# Patient Record
Sex: MALE | Race: OTHER | HISPANIC OR LATINO | URBAN - METROPOLITAN AREA
[De-identification: names, ages, dates, MRNs, and addresses within clinical notes are randomized per-mention and may not be internally consistent; named-entity substitution may affect disease eponyms.]

---

## 2022-07-25 ENCOUNTER — EMERGENCY (EMERGENCY)
Facility: HOSPITAL | Age: 22
LOS: 1 days | Discharge: HOME | End: 2022-07-25
Attending: STUDENT IN AN ORGANIZED HEALTH CARE EDUCATION/TRAINING PROGRAM
Payer: SELF-PAY

## 2022-07-25 VITALS
WEIGHT: 125 LBS | HEART RATE: 68 BPM | HEIGHT: 65.75 IN | OXYGEN SATURATION: 100 % | TEMPERATURE: 98 F | SYSTOLIC BLOOD PRESSURE: 105 MMHG | DIASTOLIC BLOOD PRESSURE: 67 MMHG | RESPIRATION RATE: 18 BRPM

## 2022-07-25 PROCEDURE — 99283 EMERGENCY DEPT VISIT LOW MDM: CPT

## 2022-07-25 PROCEDURE — 99282 EMERGENCY DEPT VISIT SF MDM: CPT

## 2022-07-25 PROCEDURE — 99284 EMERGENCY DEPT VISIT MOD MDM: CPT

## 2022-07-25 NOTE — ED PROVIDER NOTE - PATIENT PORTAL LINK FT
You can access the FollowMyHealth Patient Portal offered by [facility] by registering at the following website: [email or web address]. By joining [employer]’s FollowMyHealth portal, you will also be able to view your health information using other applications (apps) compatible with our system.
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
## 2022-07-25 NOTE — ED ADULT NURSE NOTE - NSIMPLEMENTINTERV_GEN_ALL_ED
Patient presented after waiting 30 minutes with no reaction to allergy injections. Discharged from clinic.    Magdiel Baker RN....6/25/2019 2:54 PM     
Implemented All Universal Safety Interventions:  [place] to call system. Call bell, personal items and telephone within reach. Instruct patient to call for assistance. Room bathroom lighting operational. Non-slip footwear when patient is off stretcher. Physically safe environment: no spills, clutter or unnecessary equipment. Stretcher in lowest position, wheels locked, appropriate side rails in place.

## 2022-07-25 NOTE — ED PROVIDER NOTE - NSFOLLOWUPCLINICS_GEN_ALL_ED_FT
[facility] [place]  Plastic Surgery  [address], [address]  [place] [address]  Phone: [phone]  Fax:

## 2022-07-25 NOTE — ED ADULT NURSE NOTE - EENT ASSESSMENT, MLM
William Fong is a 62 year old male patient is in for sharp pain in lower left side also nausea since last night. Pain is a 6 or 7         Medications: medications verified, no change    Refills needed today?  NO    Tobacco History:verified     denies Latex allergy or sensitivity    Advance Directives: discussed and advised the patient to complete one     Health Maintenance Due   Topic Date Due   • Diabetes A1C  04/26/2021   • Influenza Vaccine (1) 09/01/2021   • COVID-19 Vaccine (3 - Booster for Moderna series) 09/30/2021   • DM/CKD GFR  10/26/2021   • Diabetes Foot Exam  10/30/2021   • Depression Screening  10/30/2021       Patient is due for the topics as listed above and wishes to proceed with them. Discuss with MD      COVID-19 Vaccine Interest Assessment:   • Patient reported already received outside of [facility]  If the patient reports an outside immunization, please update the WIR/ICARE information in the Immunizations activity    - - -

## 2022-07-25 NOTE — ED ADULT TRIAGE NOTE - NSSEPSISSUSPECTED_ED_A_ED
Wife left message on triage number     Spoke to wife at [phone] and would like notes of visit mailed to pt' home address and to review if Dr. Cavazos will get copy      Reviewed would ask  to mail notes to patient    Reviewed Dr. Cavazos referring provider and on call team and will also receive notes of visit     Wife seemed satisfied with information     Note to  for assistance with request    Kamaljit Camara RN 5:11 PM 02/03/21       No

## 2022-07-25 NOTE — ED PROVIDER NOTE - NSFOLLOWUPINSTRUCTIONS_ED_ALL_ED_FT
1- Continue Augmentin and Afrin as prescribed  2- Follow up with  3- Return to ER for any new or worsening symptoms    4- Motrin / Tylenol as needed for pain 1- Continúe con Augmentin y Afrin según lo prescrito  2- Seguimiento con el [facility] según las instrucciones  3- Regrese a la nghia de emergencias por cualquier síntoma nuevo o que empeore  4- Motrin/Tylenol según sea necesario para el dolor

## 2023-08-01 NOTE — ED ADULT TRIAGE NOTE - PAIN: PRESENCE, MLM
complains of pain/discomfort
Physical Exam:  Gen: No acute distress, awake and alert, appropriate for situation, nontoxic and appears well hydrated  Head: NCAT, no hematomas, battles signs, raccoons eyes, tenderness or stepoffs.  ENT: Normal conjunctiva, EOMI, PERRL, TM normal, Nares patent, throat normal, neck supple FROM. No septal hematoma, no hemotympanum NO lymphadenopathy  Chest: Regular rate and rhythm, normal s1/s2, normal perfusion, NO rubs, murmurs, gallops, NO LE edema  Lungs: Symmetrical chest rise, lungs CTAB, good aeration, even and unlabored breathing NO retractions  Abdomen: soft, NTND, No rebound/guarding  Ext: No gross deformities.  Neuro:  Cranial Nerves II-XII intact & symmetric.  Speech is normal and fluent.  Motor 5/5 and symmetric in both upper & lower extremities with normal tone and no tremor.  Sensation intact in both upper and lower extremities.  Gait normal, negative romberg  Normal finger to nose, no dysdiadochokinesia   Skin: skin warm and dry, Cap refill <2 seconds. no rashes, pallor, cyanosis.